# Patient Record
Sex: MALE | Race: WHITE | NOT HISPANIC OR LATINO
[De-identification: names, ages, dates, MRNs, and addresses within clinical notes are randomized per-mention and may not be internally consistent; named-entity substitution may affect disease eponyms.]

---

## 2018-12-18 ENCOUNTER — INBOUND DOCUMENT (OUTPATIENT)
Age: 78
End: 2018-12-18

## 2018-12-18 PROBLEM — Z00.00 ENCOUNTER FOR PREVENTIVE HEALTH EXAMINATION: Status: ACTIVE | Noted: 2018-12-18

## 2019-01-14 ENCOUNTER — APPOINTMENT (OUTPATIENT)
Dept: OTOLARYNGOLOGY | Facility: CLINIC | Age: 79
End: 2019-01-14
Payer: MEDICARE

## 2019-01-14 VITALS
DIASTOLIC BLOOD PRESSURE: 86 MMHG | SYSTOLIC BLOOD PRESSURE: 172 MMHG | HEIGHT: 71 IN | WEIGHT: 147 LBS | HEART RATE: 88 BPM | BODY MASS INDEX: 20.58 KG/M2

## 2019-01-14 DIAGNOSIS — Z85.118 PERSONAL HISTORY OF OTHER MALIGNANT NEOPLASM OF BRONCHUS AND LUNG: ICD-10-CM

## 2019-01-14 DIAGNOSIS — F17.200 NICOTINE DEPENDENCE, UNSPECIFIED, UNCOMPLICATED: ICD-10-CM

## 2019-01-14 DIAGNOSIS — Z87.898 PERSONAL HISTORY OF OTHER SPECIFIED CONDITIONS: ICD-10-CM

## 2019-01-14 DIAGNOSIS — Z85.21 PERSONAL HISTORY OF MALIGNANT NEOPLASM OF LARYNX: ICD-10-CM

## 2019-01-14 DIAGNOSIS — E11.9 TYPE 2 DIABETES MELLITUS W/OUT COMPLICATIONS: ICD-10-CM

## 2019-01-14 DIAGNOSIS — Z80.0 FAMILY HISTORY OF MALIGNANT NEOPLASM OF DIGESTIVE ORGANS: ICD-10-CM

## 2019-01-14 PROCEDURE — 99204 OFFICE O/P NEW MOD 45 MIN: CPT | Mod: 25

## 2019-01-14 PROCEDURE — 31575 DIAGNOSTIC LARYNGOSCOPY: CPT

## 2019-01-14 RX ORDER — CLOPIDOGREL BISULFATE 75 MG/1
75 TABLET, FILM COATED ORAL
Refills: 0 | Status: ACTIVE | COMMUNITY

## 2019-01-14 RX ORDER — SITAGLIPTIN 100 MG/1
100 TABLET, FILM COATED ORAL
Refills: 0 | Status: ACTIVE | COMMUNITY

## 2019-01-14 RX ORDER — TAMSULOSIN HYDROCHLORIDE 0.4 MG/1
0.4 CAPSULE ORAL
Refills: 0 | Status: ACTIVE | COMMUNITY

## 2019-01-14 RX ORDER — UBIDECARENONE/VIT E ACET 100MG-5
50 MCG CAPSULE ORAL
Refills: 0 | Status: ACTIVE | COMMUNITY

## 2019-01-14 RX ORDER — METOPROLOL SUCCINATE 25 MG/1
25 TABLET, EXTENDED RELEASE ORAL
Refills: 0 | Status: ACTIVE | COMMUNITY

## 2019-01-14 RX ORDER — CILOSTAZOL 100 MG/1
TABLET ORAL
Refills: 0 | Status: ACTIVE | COMMUNITY

## 2019-01-14 RX ORDER — SIMVASTATIN 10 MG/1
10 TABLET, FILM COATED ORAL
Refills: 0 | Status: ACTIVE | COMMUNITY

## 2019-01-14 NOTE — HISTORY OF PRESENT ILLNESS
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  Pt has hx of laryngeal cancer, R VC treated with laser surgery and  RT in 2011.  Pt also has a hx of adenocarcinoma of the lung cancer, resected in 2011 and subsequent brain mets which was resected in 2012 at [place].   Pt has odynophagia, globus sensation and dysphonia.  Denies dysphagia.  He feels that his weight is stable and denies otalgia.  He has an approx. 65 pack year smoking history and continues to smoke one pack per day.  He also has a history of alcohol abuse but quit drinking after his brain surgery in 2012.

## 2019-01-14 NOTE — DATA REVIEWED
[de-identified] : PET/CT with hypermetabolic activitity in the HPx and cervical esophagus, SUV 6.6.  No obvious discrete lesion on cross-sectional imaging is reported.  We will obtain the actual imaging for review.

## 2019-01-14 NOTE — PROCEDURE
[Hoarseness] : hoarseness not clearly evaluated by indirect laryngoscopy [Topical Lidocaine] : topical lidocaine [Oxymetazoline HCl] : oxymetazoline HCl [Flexible Endoscope] : examined with the flexible endoscope [Serial Number: ___] : Serial Number: [unfilled] [de-identified] : No lesions in the NPx, OPx, HPx or larynx.  Expected posttreatment changes with exception of R. VC paralysis, L. VC is mobile with good glottic closure, airway patent.\par \par

## 2019-01-14 NOTE — PHYSICAL EXAM
[de-identified] : Posttreatment changes, no LAD. [Edentulous] : edentulous [Laryngoscopy Performed] : laryngoscopy was performed, see procedure section for findings [FreeTextEntry1] : No concerning lesions in the OC/OPx on inspection or palpation.\par  [Normal] : no rashes

## 2019-01-14 NOTE — CONSULT LETTER
[Dear  ___] : Dear  [unfilled], [Consult Letter:] : I had the pleasure of evaluating your patient, [unfilled]. [Please see my note below.] : Please see my note below. [Consult Closing:] : Thank you very much for allowing me to participate in the care of this patient.  If you have any questions, please do not hesitate to contact me. [Sincerely,] : Sincerely, [FreeTextEntry2] : Dr. Abel Gerber\par  [FreeTextEntry3] : Dev

## 2019-01-15 ENCOUNTER — OTHER (OUTPATIENT)
Age: 79
End: 2019-01-15

## 2019-01-28 ENCOUNTER — OUTPATIENT (OUTPATIENT)
Dept: OUTPATIENT SERVICES | Facility: HOSPITAL | Age: 79
LOS: 1 days | End: 2019-01-28
Payer: MEDICARE

## 2019-01-28 VITALS
SYSTOLIC BLOOD PRESSURE: 138 MMHG | DIASTOLIC BLOOD PRESSURE: 84 MMHG | TEMPERATURE: 98 F | HEART RATE: 92 BPM | OXYGEN SATURATION: 99 % | HEIGHT: 71 IN | RESPIRATION RATE: 14 BRPM | WEIGHT: 128.09 LBS

## 2019-01-28 DIAGNOSIS — I10 ESSENTIAL (PRIMARY) HYPERTENSION: ICD-10-CM

## 2019-01-28 DIAGNOSIS — Z98.890 OTHER SPECIFIED POSTPROCEDURAL STATES: Chronic | ICD-10-CM

## 2019-01-28 DIAGNOSIS — J38.00 PARALYSIS OF VOCAL CORDS AND LARYNX, UNSPECIFIED: ICD-10-CM

## 2019-01-28 DIAGNOSIS — E11.9 TYPE 2 DIABETES MELLITUS WITHOUT COMPLICATIONS: ICD-10-CM

## 2019-01-28 DIAGNOSIS — R06.83 SNORING: ICD-10-CM

## 2019-01-28 DIAGNOSIS — R09.89 OTHER SPECIFIED SYMPTOMS AND SIGNS INVOLVING THE CIRCULATORY AND RESPIRATORY SYSTEMS: ICD-10-CM

## 2019-01-28 LAB
ALBUMIN SERPL ELPH-MCNC: 4.8 G/DL — SIGNIFICANT CHANGE UP (ref 3.3–5)
ALP SERPL-CCNC: 95 U/L — SIGNIFICANT CHANGE UP (ref 40–120)
ALT FLD-CCNC: 16 U/L — SIGNIFICANT CHANGE UP (ref 4–41)
ANION GAP SERPL CALC-SCNC: 12 MMO/L — SIGNIFICANT CHANGE UP (ref 7–14)
AST SERPL-CCNC: 19 U/L — SIGNIFICANT CHANGE UP (ref 4–40)
BILIRUB SERPL-MCNC: 0.4 MG/DL — SIGNIFICANT CHANGE UP (ref 0.2–1.2)
BUN SERPL-MCNC: 13 MG/DL — SIGNIFICANT CHANGE UP (ref 7–23)
CALCIUM SERPL-MCNC: 10 MG/DL — SIGNIFICANT CHANGE UP (ref 8.4–10.5)
CHLORIDE SERPL-SCNC: 100 MMOL/L — SIGNIFICANT CHANGE UP (ref 98–107)
CO2 SERPL-SCNC: 29 MMOL/L — SIGNIFICANT CHANGE UP (ref 22–31)
CREAT SERPL-MCNC: 0.71 MG/DL — SIGNIFICANT CHANGE UP (ref 0.5–1.3)
GLUCOSE SERPL-MCNC: 89 MG/DL — SIGNIFICANT CHANGE UP (ref 70–99)
HBA1C BLD-MCNC: 6.1 % — HIGH (ref 4–5.6)
HCT VFR BLD CALC: 47.5 % — SIGNIFICANT CHANGE UP (ref 39–50)
HGB BLD-MCNC: 15.3 G/DL — SIGNIFICANT CHANGE UP (ref 13–17)
MCHC RBC-ENTMCNC: 29.3 PG — SIGNIFICANT CHANGE UP (ref 27–34)
MCHC RBC-ENTMCNC: 32.2 % — SIGNIFICANT CHANGE UP (ref 32–36)
MCV RBC AUTO: 90.8 FL — SIGNIFICANT CHANGE UP (ref 80–100)
NRBC # FLD: 0 K/UL — LOW (ref 25–125)
PLATELET # BLD AUTO: 161 K/UL — SIGNIFICANT CHANGE UP (ref 150–400)
PMV BLD: 11.1 FL — SIGNIFICANT CHANGE UP (ref 7–13)
POTASSIUM SERPL-MCNC: 4.1 MMOL/L — SIGNIFICANT CHANGE UP (ref 3.5–5.3)
POTASSIUM SERPL-SCNC: 4.1 MMOL/L — SIGNIFICANT CHANGE UP (ref 3.5–5.3)
PROT SERPL-MCNC: 7.4 G/DL — SIGNIFICANT CHANGE UP (ref 6–8.3)
RBC # BLD: 5.23 M/UL — SIGNIFICANT CHANGE UP (ref 4.2–5.8)
RBC # FLD: 13.4 % — SIGNIFICANT CHANGE UP (ref 10.3–14.5)
SODIUM SERPL-SCNC: 141 MMOL/L — SIGNIFICANT CHANGE UP (ref 135–145)
WBC # BLD: 6.86 K/UL — SIGNIFICANT CHANGE UP (ref 3.8–10.5)
WBC # FLD AUTO: 6.86 K/UL — SIGNIFICANT CHANGE UP (ref 3.8–10.5)

## 2019-01-28 PROCEDURE — 93010 ELECTROCARDIOGRAM REPORT: CPT

## 2019-01-28 NOTE — H&P PST ADULT - NEGATIVE GASTROINTESTINAL SYMPTOMS
no constipation/no change in bowel habits/no melena/no vomiting/no nausea/no abdominal pain/no diarrhea

## 2019-01-28 NOTE — H&P PST ADULT - HISTORY OF PRESENT ILLNESS
78 year old male with a history of Laryngeal cancer, S/P Laser surgery, RT in 2011, history of Adenocarcinoma of lung cancer in 2011, subsequent brain mets with resection in 2012. Patient with a recent complaints of Dysphonia, and Odynophagia. Patient is s/p CT/MRI/ PET scan with abnormal findings. Patient was referred to Dr. Lopez for an evaluation. Pre op diagnosis: Paralysis of vocal cords and larynx, unspecified. Patient is scheduled for Direct Laryngoscopy with biopsy esophagoscopy scheduled on 1/30/2019.     Patient stopped Plavix and Asprin on 1/24/2019.     Patient is a poor historian.

## 2019-01-28 NOTE — H&P PST ADULT - NEGATIVE GENERAL GENITOURINARY SYMPTOMS
no flank pain R/History of BPH/no bladder infections/no dysuria/normal urinary frequency/no urine discoloration/no urinary hesitancy/no nocturia/no hematuria/no flank pain L

## 2019-01-28 NOTE — H&P PST ADULT - NEGATIVE MUSCULOSKELETAL SYMPTOMS
no joint swelling/no myalgia/no arthritis/no back pain/no leg pain L/no muscle cramps/no muscle weakness/no neck pain/no leg pain R/no stiffness/no arm pain L/no arm pain R

## 2019-01-28 NOTE — H&P PST ADULT - PSH
History of surgery  Fracture of skull in 1949  History of surgery  Adenocarcinoma of lung cancer in 2011 subsequent brain mets with resection in 2012  History of surgery  Adenocarcinoma of lung cancer resection  History of surgery  History of laryngeal cancer- laser surgery and RT in 2011

## 2019-01-28 NOTE — H&P PST ADULT - RS GEN PE MLT RESP DETAILS PC
airway patent/breath sounds equal/no rales/clear to auscultation bilaterally/good air movement/respirations non-labored/no wheezes/no rhonchi

## 2019-01-28 NOTE — H&P PST ADULT - NEGATIVE NEUROLOGICAL SYMPTOMS
no loss of sensation/no loss of consciousness/no generalized seizures/no tremors/no vertigo/no focal seizures/no weakness/no confusion/no transient paralysis/no paresthesias/no headache

## 2019-01-28 NOTE — H&P PST ADULT - PMH
Adenocarcinoma of lung  with brain mets with resection in 2012 at [place]  BPH (benign prostatic hyperplasia)    CAD (coronary artery disease)    Diabetes  Type 2 - Does not check FS.  History of stroke  Per patient in 2009  Hypertension    Laryngeal cancer  Laser surgery and RT- in 2011  Paralysis of vocal cords and larynx, unspecified

## 2019-01-28 NOTE — H&P PST ADULT - NSANTHOSAYNRD_GEN_A_CORE
No. LILLY screening performed.  STOP BANG Legend: 0-2 = LOW Risk; 3-4 = INTERMEDIATE Risk; 5-8 = HIGH Risk

## 2019-01-28 NOTE — H&P PST ADULT - PROBLEM SELECTOR PLAN 1
Patient is scheduled for Direct Laryngoscopy with biopsy esophagoscopy scheduled on 1/30/2019.     Preop instructions, pepcid, surgical scrub provided. Pt stated understanding.    Pending medical evaluation per surgeon and PST- Patient is a poor historian. Dr. Bui -[phone]-[phone] Patient has an appointment on 1/29/2019 at 10: 30am.    Asprin and Plavix: ( History of stroke)Patient stopped Plavix and Asprin on 1/24/2019 on his own - Pending written instructions by Dr. Bui ( ok to stop Asprin and Plavix on 1/24/2019). Patient is scheduled for Direct Laryngoscopy with biopsy esophagoscopy scheduled on 1/30/2019.     Preop instructions, pepcid, surgical scrub provided. Pt stated understanding.    Pending medical evaluation per surgeon and PST- Patient is a poor historian. Dr. Bui -[phone]-PMD Patient has an appointment on 1/29/2019 at 10: 30am.    Asprin and Plavix: ( History of stroke)Patient stopped Plavix and Asprin on 1/24/2019 on his own - Pending written instructions by Dr. Bui ( ok to stop Asprin and Plavix on 1/24/2019). Spoke to Dr. Bui over the phone on 1/28/2019- per Dr. Bui stop Plavix 1/24/2019, and stop Asprin on 1/24/2019-Pending written instructions.

## 2019-01-28 NOTE — H&P PST ADULT - PROBLEM SELECTOR PLAN 3
Patient instructed last dose of Januvia is on 1/29/2019 am.     History of Diabetes- OR booking notified.

## 2019-01-28 NOTE — H&P PST ADULT - NEGATIVE BREAST SYMPTOMS
no breast tenderness R/no nipple discharge L/no breast lump R/no breast tenderness L/no breast lump L/no nipple discharge R

## 2019-01-28 NOTE — H&P PST ADULT - NEUROLOGICAL DETAILS
alert and oriented x 3/responds to pain/responds to verbal commands/strength decreased/sensation intact

## 2019-01-28 NOTE — H&P PST ADULT - REASON FOR ADMISSION
" I am having a biopsy of my vocal cords"   Pre op diagnosis: Paralysis of vocal cords and larynx, unspecified

## 2019-01-28 NOTE — H&P PST ADULT - ASSESSMENT
Pre op diagnosis: Paralysis of vocal cords and larynx, unspecified. Patient is scheduled for Direct Laryngoscopy with biopsy esophagoscopy scheduled on 1/30/2019.

## 2019-01-28 NOTE — H&P PST ADULT - NEGATIVE OPHTHALMOLOGIC SYMPTOMS
no diplopia/no photophobia/no pain R/no irritation R/no discharge L/no blurred vision L/no blurred vision R/no discharge R/no pain L/no irritation L

## 2019-01-28 NOTE — H&P PST ADULT - MUSCULOSKELETAL
details… detailed exam no joint warmth/no calf tenderness/decreased ROM/no joint erythema/no joint swelling/diminished strength

## 2019-01-29 ENCOUNTER — TRANSCRIPTION ENCOUNTER (OUTPATIENT)
Age: 79
End: 2019-01-29

## 2019-01-29 PROBLEM — C32.9 MALIGNANT NEOPLASM OF LARYNX, UNSPECIFIED: Chronic | Status: ACTIVE | Noted: 2019-01-28

## 2019-01-30 ENCOUNTER — APPOINTMENT (OUTPATIENT)
Dept: OTOLARYNGOLOGY | Facility: HOSPITAL | Age: 79
End: 2019-01-30

## 2019-01-30 ENCOUNTER — OUTPATIENT (OUTPATIENT)
Dept: OUTPATIENT SERVICES | Facility: HOSPITAL | Age: 79
LOS: 1 days | Discharge: HOME | End: 2019-01-30
Payer: MEDICARE

## 2019-01-30 ENCOUNTER — RESULT REVIEW (OUTPATIENT)
Age: 79
End: 2019-01-30

## 2019-01-30 VITALS
SYSTOLIC BLOOD PRESSURE: 166 MMHG | HEART RATE: 75 BPM | RESPIRATION RATE: 16 BRPM | OXYGEN SATURATION: 96 % | DIASTOLIC BLOOD PRESSURE: 87 MMHG

## 2019-01-30 VITALS
HEART RATE: 68 BPM | TEMPERATURE: 98 F | HEIGHT: 71 IN | WEIGHT: 128.09 LBS | OXYGEN SATURATION: 98 % | RESPIRATION RATE: 16 BRPM | DIASTOLIC BLOOD PRESSURE: 78 MMHG | SYSTOLIC BLOOD PRESSURE: 179 MMHG

## 2019-01-30 DIAGNOSIS — Z98.890 OTHER SPECIFIED POSTPROCEDURAL STATES: Chronic | ICD-10-CM

## 2019-01-30 DIAGNOSIS — J38.00 PARALYSIS OF VOCAL CORDS AND LARYNX, UNSPECIFIED: ICD-10-CM

## 2019-01-30 PROBLEM — C34.90 MALIGNANT NEOPLASM OF UNSPECIFIED PART OF UNSPECIFIED BRONCHUS OR LUNG: Chronic | Status: ACTIVE | Noted: 2019-01-28

## 2019-01-30 PROBLEM — I10 ESSENTIAL (PRIMARY) HYPERTENSION: Chronic | Status: ACTIVE | Noted: 2019-01-28

## 2019-01-30 PROBLEM — N40.0 BENIGN PROSTATIC HYPERPLASIA WITHOUT LOWER URINARY TRACT SYMPTOMS: Chronic | Status: ACTIVE | Noted: 2019-01-28

## 2019-01-30 PROBLEM — Z86.73 PERSONAL HISTORY OF TRANSIENT ISCHEMIC ATTACK (TIA), AND CEREBRAL INFARCTION WITHOUT RESIDUAL DEFICITS: Chronic | Status: ACTIVE | Noted: 2019-01-28

## 2019-01-30 PROBLEM — E11.9 TYPE 2 DIABETES MELLITUS WITHOUT COMPLICATIONS: Chronic | Status: ACTIVE | Noted: 2019-01-28

## 2019-01-30 PROBLEM — I25.10 ATHEROSCLEROTIC HEART DISEASE OF NATIVE CORONARY ARTERY WITHOUT ANGINA PECTORIS: Chronic | Status: ACTIVE | Noted: 2019-01-28

## 2019-01-30 PROCEDURE — 43191 ESOPHAGOSCOPY RIGID TRNSO DX: CPT

## 2019-01-30 PROCEDURE — 31535 LARYNGOSCOPY W/BIOPSY: CPT

## 2019-01-30 PROCEDURE — 88305 TISSUE EXAM BY PATHOLOGIST: CPT | Mod: 26

## 2019-01-30 RX ORDER — FENTANYL CITRATE 50 UG/ML
50 INJECTION INTRAVENOUS
Qty: 0 | Refills: 0 | Status: DISCONTINUED | OUTPATIENT
Start: 2019-01-30 | End: 2019-01-30

## 2019-01-30 RX ORDER — FENTANYL CITRATE 50 UG/ML
25 INJECTION INTRAVENOUS
Qty: 0 | Refills: 0 | Status: DISCONTINUED | OUTPATIENT
Start: 2019-01-30 | End: 2019-01-30

## 2019-01-30 RX ORDER — SIMVASTATIN 20 MG/1
1 TABLET, FILM COATED ORAL
Qty: 0 | Refills: 0 | COMMUNITY

## 2019-01-30 RX ORDER — CHOLECALCIFEROL (VITAMIN D3) 125 MCG
1 CAPSULE ORAL
Qty: 0 | Refills: 0 | COMMUNITY

## 2019-01-30 RX ORDER — TAMSULOSIN HYDROCHLORIDE 0.4 MG/1
1 CAPSULE ORAL
Qty: 0 | Refills: 0 | COMMUNITY

## 2019-01-30 RX ORDER — CLOPIDOGREL BISULFATE 75 MG/1
1 TABLET, FILM COATED ORAL
Qty: 0 | Refills: 0 | COMMUNITY

## 2019-01-30 RX ORDER — CILOSTAZOL 100 MG/1
1 TABLET ORAL
Qty: 0 | Refills: 0 | COMMUNITY

## 2019-01-30 RX ORDER — ACETAMINOPHEN 500 MG
1 TABLET ORAL
Qty: 15 | Refills: 0 | OUTPATIENT
Start: 2019-01-30 | End: 2019-02-03

## 2019-01-30 RX ORDER — ASPIRIN/CALCIUM CARB/MAGNESIUM 324 MG
1 TABLET ORAL
Qty: 0 | Refills: 0 | COMMUNITY

## 2019-01-30 RX ORDER — METOPROLOL TARTRATE 50 MG
1 TABLET ORAL
Qty: 0 | Refills: 0 | COMMUNITY

## 2019-01-30 RX ORDER — SITAGLIPTIN 50 MG/1
1 TABLET, FILM COATED ORAL
Qty: 0 | Refills: 0 | COMMUNITY

## 2019-01-30 NOTE — ASU DISCHARGE PLAN (ADULT/PEDIATRIC). - MEDICATION SUMMARY - MEDICATIONS TO TAKE
I will START or STAY ON the medications listed below when I get home from the hospital:    aspirin 81 mg oral tablet  -- 1 tab(s) by mouth once a day  last dose 1/23  -- Indication: For blood thinner    Tylenol 325 mg oral capsule  -- 1 cap(s) by mouth 3 times a day as needed for pain   -- Indication: For Pain     tamsulosin 0.4 mg oral capsule  -- 1 cap(s) by mouth once a day  -- Indication: For BPH    Januvia 100 mg oral tablet  -- 1 tab(s) by mouth once a day  -- Indication: For DM    simvastatin 10 mg oral tablet  -- 1 tab(s) by mouth once a day (at bedtime)  -- Indication: For cholesterol     clopidogrel 75 mg oral tablet  -- 1 tab(s) by mouth once a day  last dsoe 1/23  -- Indication: For blood thinner    Metoprolol Tartrate 25 mg oral tablet  -- 1 tab(s) by mouth once a day  -- Indication: For HTN     cilostazol 100 mg oral tablet  -- 1 tab(s) by mouth once a day  -- Indication: For outpt med    Vitamin D3 2000 intl units oral tablet  -- 1 tab(s) by mouth once a day  -- Indication: For supplement

## 2019-01-30 NOTE — ASU PATIENT PROFILE, ADULT - PMH
Adenocarcinoma of lung  with brain mets with resection in 2012 at [place]  BPH (benign prostatic hyperplasia)    CAD (coronary artery disease)    Diabetes  Type 2 - Does not check FS.  History of stroke  Per patient in 2009  Hypertension    Laryngeal cancer  Laser surgery and RT- in 2011  Paralysis of vocal cords and larynx, unspecified

## 2019-01-30 NOTE — ASU DISCHARGE PLAN (ADULT/PEDIATRIC). - NOTIFY
Bleeding that does not stop/Fever greater than 101 Pain not relieved by Medications/or shortness of breath, difficult swallowing/Bleeding that does not stop/Persistent Nausea and Vomiting/Unable to Urinate/Fever greater than 101

## 2019-01-30 NOTE — ASU DISCHARGE PLAN (ADULT/PEDIATRIC). - NURSING INSTRUCTIONS
Cool and warm liquids that are not irritating to the throat should be given for the first day or two. Avoid hot liquids. Avoid citrus juices and milk. Advance at your own pace starting with soft foods and advancing to a regular diet. Avoid rough and scratchy foods and foods that are difficult to chew for approximately 5 days. Avoid strenous exercise and blowing of nose. Make appointment with MD.

## 2019-02-11 ENCOUNTER — APPOINTMENT (OUTPATIENT)
Dept: OTOLARYNGOLOGY | Facility: CLINIC | Age: 79
End: 2019-02-11
Payer: MEDICARE

## 2019-02-11 VITALS
HEART RATE: 97 BPM | DIASTOLIC BLOOD PRESSURE: 81 MMHG | SYSTOLIC BLOOD PRESSURE: 170 MMHG | HEIGHT: 71 IN | BODY MASS INDEX: 20.58 KG/M2 | WEIGHT: 147 LBS

## 2019-02-11 PROCEDURE — 31575 DIAGNOSTIC LARYNGOSCOPY: CPT

## 2019-02-11 PROCEDURE — 99214 OFFICE O/P EST MOD 30 MIN: CPT | Mod: 25

## 2019-02-11 RX ORDER — OMEPRAZOLE 40 MG/1
40 CAPSULE, DELAYED RELEASE ORAL
Qty: 30 | Refills: 5 | Status: ACTIVE | COMMUNITY
Start: 2019-02-11 | End: 1900-01-01

## 2019-02-14 ENCOUNTER — APPOINTMENT (OUTPATIENT)
Dept: OTOLARYNGOLOGY | Facility: CLINIC | Age: 79
End: 2019-02-14

## 2019-03-03 NOTE — PHYSICAL EXAM
[de-identified] : Posttreatment changes, no LAD. [Edentulous] : edentulous [Laryngoscopy Performed] : laryngoscopy was performed, see procedure section for findings [FreeTextEntry1] : No concerning lesions in the OC/OPx on inspection or palpation.\par  [Normal] : no rashes

## 2019-03-03 NOTE — PROCEDURE
[Dysphagia] : dysphagia not clearly evaluated by indirect laryngoscopy [None] : none [Flexible Endoscope] : examined with the flexible endoscope [Serial Number: ___] : Serial Number: [unfilled] [de-identified] : No lesions in the NPx, OPx, HPx or larynx. Expected posttreatment changes with exception of R. VC paralysis, L. VC is mobile with good glottic closure, airway patent. [de-identified] : VC paralysis

## 2019-03-03 NOTE — DATA REVIEWED
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

## 2019-03-03 NOTE — HISTORY OF PRESENT ILLNESS
[de-identified] : Pt is s/p DL and bx on 1/30/19.  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  Pt c/o odynophagia when swallowing pills.  He denies any dyspnea, otalgia.  His weight remains stable.  His previous imaging was concerning for a lesion in the HPx but on EUA there was no clear lesions in that area or the cervical esophagus mucosally.  He continues to smoke one PPD.

## 2019-03-03 NOTE — CONSULT LETTER
[Dear  ___] : Dear  [unfilled], [Courtesy Letter:] : I had the pleasure of seeing your patient, [unfilled], in my office today. [Please see my note below.] : Please see my note below. [Consult Closing:] : Thank you very much for allowing me to participate in the care of this patient.  If you have any questions, please do not hesitate to contact me. [Sincerely,] : Sincerely, [FreeTextEntry2] : Dr. Abel Gerber\par [address]\[place] [address] [FreeTextEntry3] : Dev

## 2019-03-11 ENCOUNTER — APPOINTMENT (OUTPATIENT)
Dept: OTOLARYNGOLOGY | Facility: CLINIC | Age: 79
End: 2019-03-11

## 2019-06-10 ENCOUNTER — APPOINTMENT (OUTPATIENT)
Dept: OTOLARYNGOLOGY | Facility: CLINIC | Age: 79
End: 2019-06-10
Payer: MEDICARE

## 2019-06-10 VITALS
HEIGHT: 71 IN | WEIGHT: 122 LBS | BODY MASS INDEX: 17.08 KG/M2 | HEART RATE: 8 BPM | SYSTOLIC BLOOD PRESSURE: 161 MMHG | DIASTOLIC BLOOD PRESSURE: 56 MMHG

## 2019-06-10 DIAGNOSIS — K21.9 GASTRO-ESOPHAGEAL REFLUX DISEASE W/OUT ESOPHAGITIS: ICD-10-CM

## 2019-06-10 DIAGNOSIS — Z85.21 PERSONAL HISTORY OF MALIGNANT NEOPLASM OF LARYNX: ICD-10-CM

## 2019-06-10 DIAGNOSIS — C34.91 MALIGNANT NEOPLASM OF UNSPECIFIED PART OF RIGHT BRONCHUS OR LUNG: ICD-10-CM

## 2019-06-10 DIAGNOSIS — J38.00 PARALYSIS OF VOCAL CORDS AND LARYNX, UNSPECIFIED: ICD-10-CM

## 2019-06-10 PROCEDURE — 99214 OFFICE O/P EST MOD 30 MIN: CPT | Mod: 25

## 2019-06-10 PROCEDURE — 31575 DIAGNOSTIC LARYNGOSCOPY: CPT

## 2019-06-10 NOTE — HISTORY OF PRESENT ILLNESS
[de-identified] : Pt is s/p DL and bx on 1/30/19. 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 Pt c/o odynophagia when swallowing pills and food.  He denies any dyspnea, otalgia.  He continues to smoke one PPD. s/p fell 3/18/2019 fx of clavicle and loss some wt.

## 2019-06-10 NOTE — PROCEDURE
[None] : none [Dysphagia] : dysphagia not clearly evaluated by indirect laryngoscopy [Flexible Endoscope] : examined with the flexible endoscope [Serial Number: ___] : Serial Number: [unfilled] [de-identified] : No lesions in the NPx, OPx, HPx or larynx. Expected posttreatment changes with exception of R. VC paralysis, L. VC is mobile with good glottic closure, airway patent. \par  [de-identified] : VC paralysis

## 2019-06-10 NOTE — PHYSICAL EXAM
[Edentulous] : edentulous [Laryngoscopy Performed] : laryngoscopy was performed, see procedure section for findings [Normal] : no rashes [de-identified] : Posttreatment changes, no LAD. [FreeTextEntry1] : No concerning lesions in the OC/OPx on inspection or palpation.\par

## 2019-06-10 NOTE — REASON FOR VISIT
[Subsequent Evaluation] : a subsequent evaluation for [FreeTextEntry2] : hx of glottic cancer, VC paralysis, LPR

## 2019-06-10 NOTE — CONSULT LETTER
[Courtesy Letter:] : I had the pleasure of seeing your patient, [unfilled], in my office today. [Dear  ___] : Dear  [unfilled], [Please see my note below.] : Please see my note below. [Consult Closing:] : Thank you very much for allowing me to participate in the care of this patient.  If you have any questions, please do not hesitate to contact me. [Sincerely,] : Sincerely, [FreeTextEntry2] : Dr. Abel Gerber\par [address]\[place] [address]  [FreeTextEntry3] : Dev

## 2020-01-08 NOTE — H&P PST ADULT - FALLEN IN THE PAST
Initiate Treatment: Triamc cream to apply bid x 2 weeks as needed for breakouts
Detail Level: Detailed
Plan: Avoid hot showers and fragrances and moisturize twice daily especially after showering
Continue Regimen: Ketoconazole cream to apply bid as needed for breakouts
no

## 2021-03-23 NOTE — H&P PST ADULT - PROBLEM SELECTOR PROBLEM 4
Alert-The patient is alert, awake and responds to voice. The patient is oriented to time, place, and person. The triage nurse is able to obtain subjective information.
Snoring

## 2021-08-06 NOTE — H&P PST ADULT - NEGATIVE ENMT SYMPTOMS
no vertigo/no ear pain/no sinus symptoms/no nasal discharge/no post-nasal discharge/no nasal obstruction/no nasal congestion/no nose bleeds/no recurrent cold sores/no throat pain/no tinnitus/no gum bleeding/no abnormal taste sensation
